# Patient Record
Sex: FEMALE | Race: ASIAN | NOT HISPANIC OR LATINO | URBAN - METROPOLITAN AREA
[De-identification: names, ages, dates, MRNs, and addresses within clinical notes are randomized per-mention and may not be internally consistent; named-entity substitution may affect disease eponyms.]

---

## 2018-03-11 ENCOUNTER — EMERGENCY (EMERGENCY)
Facility: HOSPITAL | Age: 43
LOS: 1 days | Discharge: HOME | End: 2018-03-11
Attending: EMERGENCY MEDICINE | Admitting: EMERGENCY MEDICINE
Payer: COMMERCIAL

## 2018-03-11 VITALS
DIASTOLIC BLOOD PRESSURE: 82 MMHG | SYSTOLIC BLOOD PRESSURE: 128 MMHG | HEART RATE: 86 BPM | TEMPERATURE: 98 F | RESPIRATION RATE: 16 BRPM | WEIGHT: 128.09 LBS | OXYGEN SATURATION: 98 %

## 2018-03-11 DIAGNOSIS — O20.9 HEMORRHAGE IN EARLY PREGNANCY, UNSPECIFIED: ICD-10-CM

## 2018-03-11 DIAGNOSIS — N93.9 ABNORMAL UTERINE AND VAGINAL BLEEDING, UNSPECIFIED: ICD-10-CM

## 2018-03-11 DIAGNOSIS — Z3A.16 16 WEEKS GESTATION OF PREGNANCY: ICD-10-CM

## 2018-03-11 LAB
APTT BLD: 29.4 SEC — SIGNIFICANT CHANGE UP (ref 27.5–37.4)
BASOPHILS NFR BLD AUTO: 0.2 % — SIGNIFICANT CHANGE UP (ref 0–2)
EOSINOPHIL NFR BLD AUTO: 1.6 % — SIGNIFICANT CHANGE UP (ref 0–6)
HCT VFR BLD CALC: 37.5 % — SIGNIFICANT CHANGE UP (ref 34.5–45)
HGB BLD-MCNC: 12.8 G/DL — SIGNIFICANT CHANGE UP (ref 11.5–15.5)
INR BLD: 0.87 — LOW (ref 0.88–1.16)
LYMPHOCYTES # BLD AUTO: 23.9 % — SIGNIFICANT CHANGE UP (ref 13–44)
MCHC RBC-ENTMCNC: 30.2 PG — SIGNIFICANT CHANGE UP (ref 27–34)
MCHC RBC-ENTMCNC: 34.1 G/DL — SIGNIFICANT CHANGE UP (ref 32–36)
MCV RBC AUTO: 88.4 FL — SIGNIFICANT CHANGE UP (ref 80–100)
MONOCYTES NFR BLD AUTO: 5.8 % — SIGNIFICANT CHANGE UP (ref 2–14)
NEUTROPHILS NFR BLD AUTO: 68.5 % — SIGNIFICANT CHANGE UP (ref 43–77)
PLATELET # BLD AUTO: 251 K/UL — SIGNIFICANT CHANGE UP (ref 150–400)
PROTHROM AB SERPL-ACNC: 9.6 SEC — LOW (ref 9.8–12.7)
RBC # BLD: 4.24 M/UL — SIGNIFICANT CHANGE UP (ref 3.8–5.2)
RBC # FLD: 13.4 % — SIGNIFICANT CHANGE UP (ref 10.3–16.9)
WBC # BLD: 8.1 K/UL — SIGNIFICANT CHANGE UP (ref 3.8–10.5)
WBC # FLD AUTO: 8.1 K/UL — SIGNIFICANT CHANGE UP (ref 3.8–10.5)

## 2018-03-11 PROCEDURE — 99284 EMERGENCY DEPT VISIT MOD MDM: CPT | Mod: 25

## 2018-03-12 VITALS
DIASTOLIC BLOOD PRESSURE: 65 MMHG | HEART RATE: 78 BPM | SYSTOLIC BLOOD PRESSURE: 108 MMHG | OXYGEN SATURATION: 99 % | RESPIRATION RATE: 16 BRPM | TEMPERATURE: 98 F

## 2018-03-12 LAB
ALBUMIN SERPL ELPH-MCNC: 3.7 G/DL — SIGNIFICANT CHANGE UP (ref 3.3–5)
ALP SERPL-CCNC: 34 U/L — LOW (ref 40–120)
ALT FLD-CCNC: 21 U/L — SIGNIFICANT CHANGE UP (ref 10–45)
ANION GAP SERPL CALC-SCNC: 14 MMOL/L — SIGNIFICANT CHANGE UP (ref 5–17)
AST SERPL-CCNC: 59 U/L — HIGH (ref 10–40)
BILIRUB SERPL-MCNC: <0.2 MG/DL — SIGNIFICANT CHANGE UP (ref 0.2–1.2)
BLD GP AB SCN SERPL QL: NEGATIVE — SIGNIFICANT CHANGE UP
BUN SERPL-MCNC: 8 MG/DL — SIGNIFICANT CHANGE UP (ref 7–23)
CALCIUM SERPL-MCNC: 9.1 MG/DL — SIGNIFICANT CHANGE UP (ref 8.4–10.5)
CHLORIDE SERPL-SCNC: 100 MMOL/L — SIGNIFICANT CHANGE UP (ref 96–108)
CO2 SERPL-SCNC: 22 MMOL/L — SIGNIFICANT CHANGE UP (ref 22–31)
CREAT SERPL-MCNC: 0.49 MG/DL — LOW (ref 0.5–1.3)
GLUCOSE SERPL-MCNC: 107 MG/DL — HIGH (ref 70–99)
POTASSIUM SERPL-MCNC: 5.9 MMOL/L — HIGH (ref 3.5–5.3)
POTASSIUM SERPL-SCNC: 5.9 MMOL/L — HIGH (ref 3.5–5.3)
PROT SERPL-MCNC: 7.1 G/DL — SIGNIFICANT CHANGE UP (ref 6–8.3)
RH IG SCN BLD-IMP: POSITIVE — SIGNIFICANT CHANGE UP
SODIUM SERPL-SCNC: 136 MMOL/L — SIGNIFICANT CHANGE UP (ref 135–145)

## 2018-03-12 PROCEDURE — 76815 OB US LIMITED FETUS(S): CPT

## 2018-03-12 PROCEDURE — 85610 PROTHROMBIN TIME: CPT

## 2018-03-12 PROCEDURE — 85025 COMPLETE CBC W/AUTO DIFF WBC: CPT

## 2018-03-12 PROCEDURE — 86900 BLOOD TYPING SEROLOGIC ABO: CPT

## 2018-03-12 PROCEDURE — 36415 COLL VENOUS BLD VENIPUNCTURE: CPT

## 2018-03-12 PROCEDURE — 85730 THROMBOPLASTIN TIME PARTIAL: CPT

## 2018-03-12 PROCEDURE — 99284 EMERGENCY DEPT VISIT MOD MDM: CPT | Mod: 25

## 2018-03-12 PROCEDURE — 86901 BLOOD TYPING SEROLOGIC RH(D): CPT

## 2018-03-12 PROCEDURE — 80053 COMPREHEN METABOLIC PANEL: CPT

## 2018-03-12 PROCEDURE — 86850 RBC ANTIBODY SCREEN: CPT

## 2018-03-12 PROCEDURE — 76815 OB US LIMITED FETUS(S): CPT | Mod: 26

## 2018-03-12 NOTE — ED PROVIDER NOTE - PHYSICAL EXAMINATION
GEN: Well appearing, well nourished, awake, alert, oriented to person, place, time/situation and in no apparent distress.  ENT: Airway patent, Nasal mucosa clear. Mouth with normal mucosa.  EYES: Clear bilaterally.  RESPIRATORY: Breathing comfortably with normal RR.  CARDIAC: Regular rate and rhythm  ABDOMEN: Soft, gravid, nontender, +bowel sounds, no rebound, rigidity, or guarding.  : deferred to gyn  MSK: Range of motion is not limited, no deformities noted.  NEURO: Alert and oriented, no focal deficits.  SKIN: Skin normal color for race, warm, dry and intact. No evidence of rash.  PSYCH: Alert and oriented to person, place, time/situation. normal mood and affect. no apparent risk to self or others.

## 2018-03-12 NOTE — ED PROVIDER NOTE - OBJECTIVE STATEMENT
42F  (h/o 2 1st Trimester ), 16 weeks pregnant who p/w vaginal bleeding tonight. Pt states she cough and then had a gush of blood, no clots, no cramping, now just bleeding a little. No abdominal pain, no syncope. Ob is Dr. Arreaga.

## 2018-03-12 NOTE — CONSULT NOTE ADULT - ASSESSMENT
41yo  at 16w1d w/ complete previa and an episode of vaginal bleeding.  -Fetus with positive FH, no signs of  labor, cervix long and closed, no active bleeding  -Pelvic rest  -f/u w/ Dr. Staton this week  -f/u type--if rh negative please give rho hung    discussed with Dr. Staton. 41yo  at 16w1d w/ complete previa and an episode of vaginal bleeding.  -Fetus with positive FH, no signs of  labor, cervix long and closed, no active bleeding  -Pelvic rest  -f/u w/ Dr. Staton this week  -Rh positive    discussed with Dr. Staton.

## 2018-03-12 NOTE — CONSULT NOTE ADULT - SUBJECTIVE AND OBJECTIVE BOX
43 yo  at 16w1d presents for 1 episode of vaginal bleeding today. Patient states she coughed and had a gush of bright red blood from the vagina. Denies abdominal cramping. Feels fetal movement. Has had spotting since the initially gush. No passage of fetal tissue. This is an IVF pregnancy, otherwise uncomplicated.     Obhx: G1 '15 FT   G2 IVF SAB @5w  Gynhx: denies  PMH: denies  PSH: denies  Meds: PNV  NKDA    Vital Signs Last 24 Hrs  T(C): 36.4 (11 Mar 2018 22:49), Max: 36.4 (11 Mar 2018 22:49)  T(F): 97.6 (11 Mar 2018 22:49), Max: 97.6 (11 Mar 2018 22:49)  HR: 86 (11 Mar 2018 22:49) (86 - 86)  BP: 128/82 (11 Mar 2018 22:49) (128/82 - 128/82)  BP(mean): --  RR: 16 (11 Mar 2018 22:49) (16 - 16)  SpO2: 98% (11 Mar 2018 22:49) (98% - 98%)    Gen: NAD, AOx3  Chest: normal work of breathing  Abdomen: soft, nontender, nondistended, no rebound or guarding, fundus below umbilicus  Pelvic: cervix grossly normal, long and closed, scant dark blood, no active bleeding  Extremities: WWP    Bedside sono: , normal fetal movement, cervix 2.0 cm long, closed    LABS:                        12.8   8.1   )-----------( 251      ( 11 Mar 2018 23:41 )             37.5     03-11    136  |  100  |  8   ----------------------------<  107<H>  5.9<H>   |  22  |  0.49<L>    Ca    9.1      11 Mar 2018 23:41    TPro  7.1  /  Alb  3.7  /  TBili  <0.2  /  DBili  x   /  AST  59<H>  /  ALT  21  /  AlkPhos  34<L>  03-11    PT/INR - ( 11 Mar 2018 23:41 )   PT: 9.6 sec;   INR: 0.87          PTT - ( 11 Mar 2018 23:41 )  PTT:29.4 sec 43 yo  at 16w1d presents for 1 episode of vaginal bleeding today. Patient states she coughed and had a gush of bright red blood from the vagina. Denies abdominal cramping. Feels fetal movement. Has had spotting since the initially gush. No passage of fetal tissue. This is an IVF pregnancy, otherwise uncomplicated.     Obhx: G1 '15 FT   G2 IVF SAB @5w  Gynhx: denies  PMH: denies  PSH: denies  Meds: PNV  NKDA    Vital Signs Last 24 Hrs  T(C): 36.4 (11 Mar 2018 22:49), Max: 36.4 (11 Mar 2018 22:49)  T(F): 97.6 (11 Mar 2018 22:49), Max: 97.6 (11 Mar 2018 22:49)  HR: 86 (11 Mar 2018 22:49) (86 - 86)  BP: 128/82 (11 Mar 2018 22:49) (128/82 - 128/82)  BP(mean): --  RR: 16 (11 Mar 2018 22:49) (16 - 16)  SpO2: 98% (11 Mar 2018 22:49) (98% - 98%)    Gen: NAD, AOx3  Chest: normal work of breathing  Abdomen: soft, nontender, nondistended, no rebound or guarding, fundus below umbilicus  Pelvic: cervix grossly normal, long and closed, scant dark blood, no active bleeding  Extremities: WWP    Bedside sono: , normal fetal movement, cervix 4.0 cm long, closed, complete placenta previa    LABS:                        12.8   8.1   )-----------( 251      ( 11 Mar 2018 23:41 )             37.5     03-11    136  |  100  |  8   ----------------------------<  107<H>  5.9<H>   |  22  |  0.49<L>    Ca    9.1      11 Mar 2018 23:41    TPro  7.1  /  Alb  3.7  /  TBili  <0.2  /  DBili  x   /  AST  59<H>  /  ALT  21  /  AlkPhos  34<L>  03-11    PT/INR - ( 11 Mar 2018 23:41 )   PT: 9.6 sec;   INR: 0.87          PTT - ( 11 Mar 2018 23:41 )  PTT:29.4 sec

## 2018-04-15 ENCOUNTER — OUTPATIENT (OUTPATIENT)
Dept: OUTPATIENT SERVICES | Facility: HOSPITAL | Age: 43
LOS: 1 days | End: 2018-04-15
Payer: COMMERCIAL

## 2018-04-15 DIAGNOSIS — O26.899 OTHER SPECIFIED PREGNANCY RELATED CONDITIONS, UNSPECIFIED TRIMESTER: ICD-10-CM

## 2018-04-15 DIAGNOSIS — Z3A.00 WEEKS OF GESTATION OF PREGNANCY NOT SPECIFIED: ICD-10-CM

## 2018-04-15 PROCEDURE — 99214 OFFICE O/P EST MOD 30 MIN: CPT

## 2018-08-10 ENCOUNTER — OUTPATIENT (OUTPATIENT)
Dept: OUTPATIENT SERVICES | Facility: HOSPITAL | Age: 43
LOS: 1 days | End: 2018-08-10
Payer: COMMERCIAL

## 2018-08-10 DIAGNOSIS — O26.899 OTHER SPECIFIED PREGNANCY RELATED CONDITIONS, UNSPECIFIED TRIMESTER: ICD-10-CM

## 2018-08-10 DIAGNOSIS — Z3A.00 WEEKS OF GESTATION OF PREGNANCY NOT SPECIFIED: ICD-10-CM

## 2018-08-10 PROCEDURE — 76818 FETAL BIOPHYS PROFILE W/NST: CPT

## 2018-08-10 PROCEDURE — 59025 FETAL NON-STRESS TEST: CPT

## 2018-08-10 PROCEDURE — 99214 OFFICE O/P EST MOD 30 MIN: CPT

## 2018-08-19 ENCOUNTER — INPATIENT (INPATIENT)
Facility: HOSPITAL | Age: 43
LOS: 2 days | Discharge: HOME | End: 2018-08-22
Attending: OBSTETRICS & GYNECOLOGY | Admitting: OBSTETRICS & GYNECOLOGY
Payer: COMMERCIAL

## 2018-08-19 RX ORDER — SODIUM CHLORIDE 9 MG/ML
1000 INJECTION, SOLUTION INTRAVENOUS ONCE
Qty: 0 | Refills: 0 | Status: COMPLETED | OUTPATIENT
Start: 2018-08-19 | End: 2018-08-20

## 2018-08-19 RX ORDER — CITRIC ACID/SODIUM CITRATE 300-500 MG
15 SOLUTION, ORAL ORAL EVERY 4 HOURS
Qty: 0 | Refills: 0 | Status: DISCONTINUED | OUTPATIENT
Start: 2018-08-19 | End: 2018-08-20

## 2018-08-19 RX ORDER — OXYTOCIN 10 UNIT/ML
333.33 VIAL (ML) INJECTION
Qty: 20 | Refills: 0 | Status: DISCONTINUED | OUTPATIENT
Start: 2018-08-19 | End: 2018-08-20

## 2018-08-19 RX ORDER — SODIUM CHLORIDE 9 MG/ML
1000 INJECTION, SOLUTION INTRAVENOUS
Qty: 0 | Refills: 0 | Status: DISCONTINUED | OUTPATIENT
Start: 2018-08-19 | End: 2018-08-20

## 2018-08-20 ENCOUNTER — RESULT REVIEW (OUTPATIENT)
Age: 43
End: 2018-08-20

## 2018-08-20 VITALS — HEIGHT: 63 IN | WEIGHT: 147.93 LBS

## 2018-08-20 LAB
ALBUMIN SERPL ELPH-MCNC: 3.2 G/DL — LOW (ref 3.3–5)
ALBUMIN SERPL ELPH-MCNC: 3.7 G/DL — SIGNIFICANT CHANGE UP (ref 3.3–5)
ALP SERPL-CCNC: 123 U/L — HIGH (ref 40–120)
ALP SERPL-CCNC: 128 U/L — HIGH (ref 40–120)
ALT FLD-CCNC: 13 U/L — SIGNIFICANT CHANGE UP (ref 10–45)
ALT FLD-CCNC: 14 U/L — SIGNIFICANT CHANGE UP (ref 10–45)
ANION GAP SERPL CALC-SCNC: 14 MMOL/L — SIGNIFICANT CHANGE UP (ref 5–17)
ANION GAP SERPL CALC-SCNC: 19 MMOL/L — HIGH (ref 5–17)
APPEARANCE UR: CLEAR — SIGNIFICANT CHANGE UP
APTT BLD: 28.1 SEC — SIGNIFICANT CHANGE UP (ref 27.5–37.4)
AST SERPL-CCNC: 21 U/L — SIGNIFICANT CHANGE UP (ref 10–40)
AST SERPL-CCNC: 27 U/L — SIGNIFICANT CHANGE UP (ref 10–40)
BASE EXCESS BLDCOA CALC-SCNC: -6.1 MMOL/L — SIGNIFICANT CHANGE UP (ref -11.6–0.4)
BASE EXCESS BLDCOV CALC-SCNC: -7.2 MMOL/L — SIGNIFICANT CHANGE UP (ref -9.3–0.3)
BASOPHILS NFR BLD AUTO: 0.1 % — SIGNIFICANT CHANGE UP (ref 0–2)
BILIRUB SERPL-MCNC: 0.2 MG/DL — SIGNIFICANT CHANGE UP (ref 0.2–1.2)
BILIRUB SERPL-MCNC: 0.4 MG/DL — SIGNIFICANT CHANGE UP (ref 0.2–1.2)
BILIRUB UR-MCNC: NEGATIVE — SIGNIFICANT CHANGE UP
BLD GP AB SCN SERPL QL: NEGATIVE — SIGNIFICANT CHANGE UP
BUN SERPL-MCNC: 13 MG/DL — SIGNIFICANT CHANGE UP (ref 7–23)
BUN SERPL-MCNC: 9 MG/DL — SIGNIFICANT CHANGE UP (ref 7–23)
CALCIUM SERPL-MCNC: 9.1 MG/DL — SIGNIFICANT CHANGE UP (ref 8.4–10.5)
CALCIUM SERPL-MCNC: 9.5 MG/DL — SIGNIFICANT CHANGE UP (ref 8.4–10.5)
CHLORIDE SERPL-SCNC: 100 MMOL/L — SIGNIFICANT CHANGE UP (ref 96–108)
CHLORIDE SERPL-SCNC: 100 MMOL/L — SIGNIFICANT CHANGE UP (ref 96–108)
CO2 SERPL-SCNC: 20 MMOL/L — LOW (ref 22–31)
CO2 SERPL-SCNC: 22 MMOL/L — SIGNIFICANT CHANGE UP (ref 22–31)
COLOR SPEC: YELLOW — SIGNIFICANT CHANGE UP
CREAT SERPL-MCNC: 0.6 MG/DL — SIGNIFICANT CHANGE UP (ref 0.5–1.3)
CREAT SERPL-MCNC: 0.61 MG/DL — SIGNIFICANT CHANGE UP (ref 0.5–1.3)
DIFF PNL FLD: ABNORMAL
EOSINOPHIL NFR BLD AUTO: 0.7 % — SIGNIFICANT CHANGE UP (ref 0–6)
FIBRINOGEN PPP-MCNC: 482 MG/DL — HIGH (ref 258–438)
GAS PNL BLDCOA: SIGNIFICANT CHANGE UP
GAS PNL BLDCOV: 7.3 — SIGNIFICANT CHANGE UP (ref 7.25–7.45)
GAS PNL BLDCOV: SIGNIFICANT CHANGE UP
GLUCOSE SERPL-MCNC: 118 MG/DL — HIGH (ref 70–99)
GLUCOSE SERPL-MCNC: 52 MG/DL — LOW (ref 70–99)
GLUCOSE UR QL: NEGATIVE — SIGNIFICANT CHANGE UP
HCO3 BLDCOA-SCNC: 23 MMOL/L — SIGNIFICANT CHANGE UP
HCO3 BLDCOV-SCNC: 18.6 MMOL/L — SIGNIFICANT CHANGE UP
HCT VFR BLD CALC: 29.4 % — LOW (ref 34.5–45)
HCT VFR BLD CALC: 36.1 % — SIGNIFICANT CHANGE UP (ref 34.5–45)
HCT VFR BLD CALC: 37.6 % — SIGNIFICANT CHANGE UP (ref 34.5–45)
HGB BLD-MCNC: 10 G/DL — LOW (ref 11.5–15.5)
HGB BLD-MCNC: 12.5 G/DL — SIGNIFICANT CHANGE UP (ref 11.5–15.5)
HGB BLD-MCNC: 12.6 G/DL — SIGNIFICANT CHANGE UP (ref 11.5–15.5)
INR BLD: 0.89 — SIGNIFICANT CHANGE UP (ref 0.88–1.16)
KETONES UR-MCNC: 15 MG/DL
LDH SERPL L TO P-CCNC: 317 U/L — HIGH (ref 50–242)
LDH SERPL L TO P-CCNC: 329 U/L — HIGH (ref 50–242)
LEUKOCYTE ESTERASE UR-ACNC: NEGATIVE — SIGNIFICANT CHANGE UP
LYMPHOCYTES # BLD AUTO: 18.7 % — SIGNIFICANT CHANGE UP (ref 13–44)
MCHC RBC-ENTMCNC: 29.8 PG — SIGNIFICANT CHANGE UP (ref 27–34)
MCHC RBC-ENTMCNC: 29.9 PG — SIGNIFICANT CHANGE UP (ref 27–34)
MCHC RBC-ENTMCNC: 30.6 PG — SIGNIFICANT CHANGE UP (ref 27–34)
MCHC RBC-ENTMCNC: 33.5 G/DL — SIGNIFICANT CHANGE UP (ref 32–36)
MCHC RBC-ENTMCNC: 34 G/DL — SIGNIFICANT CHANGE UP (ref 32–36)
MCHC RBC-ENTMCNC: 34.6 G/DL — SIGNIFICANT CHANGE UP (ref 32–36)
MCV RBC AUTO: 87.8 FL — SIGNIFICANT CHANGE UP (ref 80–100)
MCV RBC AUTO: 88.3 FL — SIGNIFICANT CHANGE UP (ref 80–100)
MCV RBC AUTO: 88.9 FL — SIGNIFICANT CHANGE UP (ref 80–100)
MONOCYTES NFR BLD AUTO: 7 % — SIGNIFICANT CHANGE UP (ref 2–14)
NEUTROPHILS NFR BLD AUTO: 73.5 % — SIGNIFICANT CHANGE UP (ref 43–77)
NITRITE UR-MCNC: NEGATIVE — SIGNIFICANT CHANGE UP
PCO2 BLDCOA: 60 MMHG — SIGNIFICANT CHANGE UP (ref 32–66)
PCO2 BLDCOV: 39 MMHG — SIGNIFICANT CHANGE UP (ref 27–49)
PH BLDCOA: 7.2 — SIGNIFICANT CHANGE UP (ref 7.18–7.38)
PH UR: 7 — SIGNIFICANT CHANGE UP (ref 5–8)
PLATELET # BLD AUTO: 162 K/UL — SIGNIFICANT CHANGE UP (ref 150–400)
PLATELET # BLD AUTO: 183 K/UL — SIGNIFICANT CHANGE UP (ref 150–400)
PLATELET # BLD AUTO: 201 K/UL — SIGNIFICANT CHANGE UP (ref 150–400)
PO2 BLDCOA: 18 MMHG — SIGNIFICANT CHANGE UP (ref 6–31)
PO2 BLDCOA: 34 MMHG — SIGNIFICANT CHANGE UP (ref 17–41)
POTASSIUM SERPL-MCNC: 4 MMOL/L — SIGNIFICANT CHANGE UP (ref 3.5–5.3)
POTASSIUM SERPL-MCNC: 4.1 MMOL/L — SIGNIFICANT CHANGE UP (ref 3.5–5.3)
POTASSIUM SERPL-SCNC: 4 MMOL/L — SIGNIFICANT CHANGE UP (ref 3.5–5.3)
POTASSIUM SERPL-SCNC: 4.1 MMOL/L — SIGNIFICANT CHANGE UP (ref 3.5–5.3)
PROT SERPL-MCNC: 6.1 G/DL — SIGNIFICANT CHANGE UP (ref 6–8.3)
PROT SERPL-MCNC: 6.8 G/DL — SIGNIFICANT CHANGE UP (ref 6–8.3)
PROT UR-MCNC: NEGATIVE MG/DL — SIGNIFICANT CHANGE UP
PROTHROM AB SERPL-ACNC: 9.8 SEC — SIGNIFICANT CHANGE UP (ref 9.8–12.7)
RBC # BLD: 3.35 M/UL — LOW (ref 3.8–5.2)
RBC # BLD: 4.09 M/UL — SIGNIFICANT CHANGE UP (ref 3.8–5.2)
RBC # BLD: 4.23 M/UL — SIGNIFICANT CHANGE UP (ref 3.8–5.2)
RBC # FLD: 13.3 % — SIGNIFICANT CHANGE UP (ref 10.3–16.9)
RBC # FLD: 13.7 % — SIGNIFICANT CHANGE UP (ref 10.3–16.9)
RBC # FLD: 13.8 % — SIGNIFICANT CHANGE UP (ref 10.3–16.9)
RH IG SCN BLD-IMP: POSITIVE — SIGNIFICANT CHANGE UP
SAO2 % BLDCOA: 29.7 % — SIGNIFICANT CHANGE UP
SAO2 % BLDCOV: 75.3 % — SIGNIFICANT CHANGE UP
SODIUM SERPL-SCNC: 136 MMOL/L — SIGNIFICANT CHANGE UP (ref 135–145)
SODIUM SERPL-SCNC: 139 MMOL/L — SIGNIFICANT CHANGE UP (ref 135–145)
SP GR SPEC: <=1.005 — SIGNIFICANT CHANGE UP (ref 1–1.03)
T PALLIDUM AB TITR SER: NEGATIVE — SIGNIFICANT CHANGE UP
URATE SERPL-MCNC: 5.9 MG/DL — SIGNIFICANT CHANGE UP (ref 2.5–7)
URATE SERPL-MCNC: 6.1 MG/DL — SIGNIFICANT CHANGE UP (ref 2.5–7)
UROBILINOGEN FLD QL: 0.2 E.U./DL — SIGNIFICANT CHANGE UP
WBC # BLD: 12.2 K/UL — HIGH (ref 3.8–10.5)
WBC # BLD: 17.5 K/UL — HIGH (ref 3.8–10.5)
WBC # BLD: 7.6 K/UL — SIGNIFICANT CHANGE UP (ref 3.8–10.5)
WBC # FLD AUTO: 12.2 K/UL — HIGH (ref 3.8–10.5)
WBC # FLD AUTO: 17.5 K/UL — HIGH (ref 3.8–10.5)
WBC # FLD AUTO: 7.6 K/UL — SIGNIFICANT CHANGE UP (ref 3.8–10.5)

## 2018-08-20 RX ORDER — IBUPROFEN 200 MG
600 TABLET ORAL EVERY 6 HOURS
Qty: 0 | Refills: 0 | Status: DISCONTINUED | OUTPATIENT
Start: 2018-08-20 | End: 2018-08-22

## 2018-08-20 RX ORDER — MAGNESIUM HYDROXIDE 400 MG/1
30 TABLET, CHEWABLE ORAL
Qty: 0 | Refills: 0 | Status: DISCONTINUED | OUTPATIENT
Start: 2018-08-20 | End: 2018-08-22

## 2018-08-20 RX ORDER — LANOLIN
1 OINTMENT (GRAM) TOPICAL EVERY 6 HOURS
Qty: 0 | Refills: 0 | Status: DISCONTINUED | OUTPATIENT
Start: 2018-08-20 | End: 2018-08-22

## 2018-08-20 RX ORDER — HYDROCORTISONE 1 %
1 OINTMENT (GRAM) TOPICAL EVERY 4 HOURS
Qty: 0 | Refills: 0 | Status: DISCONTINUED | OUTPATIENT
Start: 2018-08-20 | End: 2018-08-22

## 2018-08-20 RX ORDER — ACETAMINOPHEN 500 MG
650 TABLET ORAL EVERY 6 HOURS
Qty: 0 | Refills: 0 | Status: DISCONTINUED | OUTPATIENT
Start: 2018-08-20 | End: 2018-08-22

## 2018-08-20 RX ORDER — TETANUS TOXOID, REDUCED DIPHTHERIA TOXOID AND ACELLULAR PERTUSSIS VACCINE, ADSORBED 5; 2.5; 8; 8; 2.5 [IU]/.5ML; [IU]/.5ML; UG/.5ML; UG/.5ML; UG/.5ML
0.5 SUSPENSION INTRAMUSCULAR ONCE
Qty: 0 | Refills: 0 | Status: DISCONTINUED | OUTPATIENT
Start: 2018-08-20 | End: 2018-08-22

## 2018-08-20 RX ORDER — PRAMOXINE HYDROCHLORIDE 150 MG/15G
1 AEROSOL, FOAM RECTAL EVERY 4 HOURS
Qty: 0 | Refills: 0 | Status: DISCONTINUED | OUTPATIENT
Start: 2018-08-20 | End: 2018-08-22

## 2018-08-20 RX ORDER — OXYCODONE AND ACETAMINOPHEN 5; 325 MG/1; MG/1
1 TABLET ORAL
Qty: 0 | Refills: 0 | Status: DISCONTINUED | OUTPATIENT
Start: 2018-08-20 | End: 2018-08-22

## 2018-08-20 RX ORDER — DOCUSATE SODIUM 100 MG
100 CAPSULE ORAL
Qty: 0 | Refills: 0 | Status: DISCONTINUED | OUTPATIENT
Start: 2018-08-20 | End: 2018-08-22

## 2018-08-20 RX ORDER — OXYTOCIN 10 UNIT/ML
41.67 VIAL (ML) INJECTION
Qty: 20 | Refills: 0 | Status: DISCONTINUED | OUTPATIENT
Start: 2018-08-20 | End: 2018-08-22

## 2018-08-20 RX ORDER — OXYTOCIN 10 UNIT/ML
1 VIAL (ML) INJECTION
Qty: 30 | Refills: 0 | Status: DISCONTINUED | OUTPATIENT
Start: 2018-08-20 | End: 2018-08-22

## 2018-08-20 RX ORDER — DIBUCAINE 1 %
1 OINTMENT (GRAM) RECTAL EVERY 4 HOURS
Qty: 0 | Refills: 0 | Status: DISCONTINUED | OUTPATIENT
Start: 2018-08-20 | End: 2018-08-22

## 2018-08-20 RX ORDER — GLYCERIN ADULT
1 SUPPOSITORY, RECTAL RECTAL AT BEDTIME
Qty: 0 | Refills: 0 | Status: DISCONTINUED | OUTPATIENT
Start: 2018-08-20 | End: 2018-08-22

## 2018-08-20 RX ORDER — OXYCODONE AND ACETAMINOPHEN 5; 325 MG/1; MG/1
2 TABLET ORAL EVERY 6 HOURS
Qty: 0 | Refills: 0 | Status: DISCONTINUED | OUTPATIENT
Start: 2018-08-20 | End: 2018-08-22

## 2018-08-20 RX ORDER — AER TRAVELER 0.5 G/1
1 SOLUTION RECTAL; TOPICAL EVERY 4 HOURS
Qty: 0 | Refills: 0 | Status: DISCONTINUED | OUTPATIENT
Start: 2018-08-20 | End: 2018-08-22

## 2018-08-20 RX ORDER — SIMETHICONE 80 MG/1
80 TABLET, CHEWABLE ORAL EVERY 6 HOURS
Qty: 0 | Refills: 0 | Status: DISCONTINUED | OUTPATIENT
Start: 2018-08-20 | End: 2018-08-22

## 2018-08-20 RX ORDER — SODIUM CHLORIDE 9 MG/ML
3 INJECTION INTRAMUSCULAR; INTRAVENOUS; SUBCUTANEOUS EVERY 8 HOURS
Qty: 0 | Refills: 0 | Status: DISCONTINUED | OUTPATIENT
Start: 2018-08-20 | End: 2018-08-22

## 2018-08-20 RX ORDER — DIPHENHYDRAMINE HCL 50 MG
25 CAPSULE ORAL EVERY 6 HOURS
Qty: 0 | Refills: 0 | Status: DISCONTINUED | OUTPATIENT
Start: 2018-08-20 | End: 2018-08-22

## 2018-08-20 RX ADMIN — SODIUM CHLORIDE 125 MILLILITER(S): 9 INJECTION, SOLUTION INTRAVENOUS at 00:20

## 2018-08-20 RX ADMIN — Medication 600 MILLIGRAM(S): at 17:40

## 2018-08-20 RX ADMIN — SODIUM CHLORIDE 2000 MILLILITER(S): 9 INJECTION, SOLUTION INTRAVENOUS at 05:17

## 2018-08-20 RX ADMIN — Medication 650 MILLIGRAM(S): at 20:25

## 2018-08-20 RX ADMIN — SODIUM CHLORIDE 3 MILLILITER(S): 9 INJECTION INTRAMUSCULAR; INTRAVENOUS; SUBCUTANEOUS at 22:24

## 2018-08-20 RX ADMIN — Medication 650 MILLIGRAM(S): at 21:30

## 2018-08-20 RX ADMIN — Medication 1 MILLIUNIT(S)/MIN: at 00:53

## 2018-08-20 NOTE — PROVIDER CONTACT NOTE (OTHER) - ACTION/TREATMENT ORDERED:
14 Pashto aceves catheter placed (pt tolerated well), 750ml of clear yellow urine obtained. Dr Morris made aware. MD will place order for aceves catheter.

## 2018-08-21 LAB
HCT VFR BLD CALC: 28.6 % — LOW (ref 34.5–45)
HGB BLD-MCNC: 9.3 G/DL — LOW (ref 11.5–15.5)
MCHC RBC-ENTMCNC: 28.9 PG — SIGNIFICANT CHANGE UP (ref 27–34)
MCHC RBC-ENTMCNC: 32.5 G/DL — SIGNIFICANT CHANGE UP (ref 32–36)
MCV RBC AUTO: 88.8 FL — SIGNIFICANT CHANGE UP (ref 80–100)
PLATELET # BLD AUTO: 153 K/UL — SIGNIFICANT CHANGE UP (ref 150–400)
RBC # BLD: 3.22 M/UL — LOW (ref 3.8–5.2)
RBC # FLD: 13.3 % — SIGNIFICANT CHANGE UP (ref 10.3–16.9)
WBC # BLD: 9.8 K/UL — SIGNIFICANT CHANGE UP (ref 3.8–10.5)
WBC # FLD AUTO: 9.8 K/UL — SIGNIFICANT CHANGE UP (ref 3.8–10.5)

## 2018-08-21 RX ADMIN — Medication 600 MILLIGRAM(S): at 07:00

## 2018-08-21 RX ADMIN — Medication 600 MILLIGRAM(S): at 14:45

## 2018-08-21 RX ADMIN — AER TRAVELER 1 APPLICATION(S): 0.5 SOLUTION RECTAL; TOPICAL at 14:29

## 2018-08-21 RX ADMIN — Medication 600 MILLIGRAM(S): at 19:51

## 2018-08-21 RX ADMIN — Medication 600 MILLIGRAM(S): at 01:00

## 2018-08-21 RX ADMIN — SODIUM CHLORIDE 3 MILLILITER(S): 9 INJECTION INTRAMUSCULAR; INTRAVENOUS; SUBCUTANEOUS at 14:29

## 2018-08-21 RX ADMIN — AER TRAVELER 1 APPLICATION(S): 0.5 SOLUTION RECTAL; TOPICAL at 18:00

## 2018-08-21 RX ADMIN — Medication 600 MILLIGRAM(S): at 06:08

## 2018-08-21 RX ADMIN — AER TRAVELER 1 APPLICATION(S): 0.5 SOLUTION RECTAL; TOPICAL at 08:00

## 2018-08-21 RX ADMIN — Medication 650 MILLIGRAM(S): at 02:41

## 2018-08-21 RX ADMIN — Medication 600 MILLIGRAM(S): at 14:03

## 2018-08-21 RX ADMIN — Medication 1 APPLICATION(S): at 14:02

## 2018-08-21 RX ADMIN — Medication 1 TABLET(S): at 14:03

## 2018-08-21 RX ADMIN — AER TRAVELER 1 APPLICATION(S): 0.5 SOLUTION RECTAL; TOPICAL at 11:00

## 2018-08-21 RX ADMIN — Medication 100 MILLIGRAM(S): at 00:15

## 2018-08-21 RX ADMIN — Medication 600 MILLIGRAM(S): at 19:13

## 2018-08-21 RX ADMIN — Medication 600 MILLIGRAM(S): at 00:15

## 2018-08-21 RX ADMIN — Medication 100 MILLIGRAM(S): at 14:03

## 2018-08-21 RX ADMIN — SODIUM CHLORIDE 3 MILLILITER(S): 9 INJECTION INTRAMUSCULAR; INTRAVENOUS; SUBCUTANEOUS at 06:00

## 2018-08-21 RX ADMIN — Medication 650 MILLIGRAM(S): at 03:30

## 2018-08-21 NOTE — LACTATION INITIAL EVALUATION - NS LACT CON REASON FOR REQ
multiparous mom/Baby ~26 hrs old, currently breastfeeding. Mother reports baby has fed well since delivery. Upon visit, baby was feeding on right breast with a shallow latch that mother stated is painful, due to incorrect positioning. Assisted the mother to properly align baby and pull in closer, chin lifted up toward the breast, and baby responded with a deeper latch and more rhythmic sucking. Mother reports no pain with latch. Positioning and deep latching techniques reviewed with good return demonstration by mother. Breastfeeding basics reviewed. Encouraged continued skin-to-skin and rooming-in and for the parents to call for further assistance as needed. Baby 39.1 GA, 3 urine/4 stool diapers, 0.75% weight loss. Mother has h/o breastfeeding previous infant for 1.5 years with no complications.

## 2018-08-21 NOTE — PROGRESS NOTE ADULT - SUBJECTIVE AND OBJECTIVE BOX
Patient evaluated at bedside.   She reports pain is well controlled with motrin and tylenol. Patient was unable to void postpartum, aceves was replaced yesterday and still in place today. To be removed this morning.     She has been ambulating without assistance, voiding spontaneously, and is breastfeeding.    She denies HA, changes in vision, RUQ pain, dizziness, chest pain, palpitations, shortness of breath, n/v, heavy vaginal bleeding or perineal discomfort.    Physical Exam:  Vital Signs Last 24 Hrs  T(C): 36.6 (20 Aug 2018 23:40), Max: 37.1 (20 Aug 2018 19:00)  T(F): 97.8 (20 Aug 2018 23:40), Max: 98.8 (20 Aug 2018 22:30)  HR: 64 (20 Aug 2018 23:40) (64 - 86)  BP: 106/67 (20 Aug 2018 23:40) (95/67 - 146/59)  BP(mean): --  RR: 18 (20 Aug 2018 23:40) (16 - 18)  SpO2: 98% (20 Aug 2018 23:40) (97% - 99%)    GA: NAD, A+0 x 3  Abd: + BS, soft, nontender, nondistended, no rebound or guarding, uterus firm at midline  : lochia WNL  Extremities: no swelling or calf tenderness                          10.0   12.2  )-----------( 162      ( 20 Aug 2018 22:22 )             29.4     08-20    136  |  100  |  9   ----------------------------<  118<H>  4.1   |  22  |  0.60    Ca    9.1      20 Aug 2018 18:01    TPro  6.1  /  Alb  3.2<L>  /  TBili  0.4  /  DBili  x   /  AST  27  /  ALT  13  /  AlkPhos  123<H>  08-20    PT/INR - ( 20 Aug 2018 10:06 )   PT: 9.8 sec;   INR: 0.89          PTT - ( 20 Aug 2018 10:06 )  PTT:28.1 sec    MEDICATIONS  (STANDING):  diphtheria/tetanus/pertussis (acellular) Vaccine (ADAcel) 0.5 milliLiter(s) IntraMuscular once  fentaNYL (2 MICROgram(s)/mL) + BUpivacaine 0.1% in 0.9% Sodium Chloride PCEA 250 milliLiter(s) Epidural PCA Continuous  ibuprofen  Tablet 600 milliGRAM(s) Oral every 6 hours  oxytocin Infusion 41.667 milliUNIT(s)/Min (125 mL/Hr) IV Continuous <Continuous>  oxytocin Infusion 1 milliUNIT(s)/Min (1 mL/Hr) IV Continuous <Continuous>  prenatal multivitamin 1 Tablet(s) Oral daily  sodium chloride 0.9% lock flush 3 milliLiter(s) IV Push every 8 hours  witch hazel Pads 1 Application(s) Topical every 4 hours    MEDICATIONS  (PRN):  acetaminophen   Tablet 650 milliGRAM(s) Oral every 6 hours PRN For Temp greater than 38.5 C (101.3 F)  acetaminophen   Tablet. 650 milliGRAM(s) Oral every 6 hours PRN Mild Pain  dibucaine 1% Ointment 1 Application(s) Topical every 4 hours PRN Mild Perineal Discomfort  diphenhydrAMINE   Capsule 25 milliGRAM(s) Oral every 6 hours PRN Itching  docusate sodium 100 milliGRAM(s) Oral two times a day PRN Stool Softening  glycerin Suppository - Adult 1 Suppository(s) Rectal at bedtime PRN Constipation  hydrocortisone 1% Cream 1 Application(s) Topical every 4 hours PRN Severe Perineal Pain  lanolin Ointment 1 Application(s) Topical every 6 hours PRN Sore Nipples  magnesium hydroxide Suspension 30 milliLiter(s) Oral two times a day PRN Constipation  oxyCODONE    5 mG/acetaminophen 325 mG 1 Tablet(s) Oral every 3 hours PRN Moderate Pain  oxyCODONE    5 mG/acetaminophen 325 mG 2 Tablet(s) Oral every 6 hours PRN Severe Pain  pramoxine 1%/zinc 5% Cream 1 Application(s) Topical every 4 hours PRN Moderate Perineal Pain  simethicone 80 milliGRAM(s) Chew every 6 hours PRN Gas

## 2018-08-21 NOTE — PROGRESS NOTE ADULT - ASSESSMENT
A/P yo 42y  s/p , PP#1, c/b shoulder dystocia, EBL 900ml and urinary retention PP  1. Pain: OPM  2. GI: Reg  3. :  aceves in place, to come out this am with TOV 6 hours later   4. DVT prophylaxis: SCDs, ambulation  5. Dispo: PP#2

## 2018-08-22 ENCOUNTER — TRANSCRIPTION ENCOUNTER (OUTPATIENT)
Age: 43
End: 2018-08-22

## 2018-08-22 VITALS
TEMPERATURE: 98 F | HEART RATE: 75 BPM | RESPIRATION RATE: 18 BRPM | OXYGEN SATURATION: 96 % | SYSTOLIC BLOOD PRESSURE: 131 MMHG | DIASTOLIC BLOOD PRESSURE: 86 MMHG

## 2018-08-22 PROCEDURE — 85025 COMPLETE CBC W/AUTO DIFF WBC: CPT

## 2018-08-22 PROCEDURE — 36415 COLL VENOUS BLD VENIPUNCTURE: CPT

## 2018-08-22 PROCEDURE — 83615 LACTATE (LD) (LDH) ENZYME: CPT

## 2018-08-22 PROCEDURE — 82803 BLOOD GASES ANY COMBINATION: CPT

## 2018-08-22 PROCEDURE — 85027 COMPLETE CBC AUTOMATED: CPT

## 2018-08-22 PROCEDURE — 86780 TREPONEMA PALLIDUM: CPT

## 2018-08-22 PROCEDURE — 86850 RBC ANTIBODY SCREEN: CPT

## 2018-08-22 PROCEDURE — 85384 FIBRINOGEN ACTIVITY: CPT

## 2018-08-22 PROCEDURE — 88307 TISSUE EXAM BY PATHOLOGIST: CPT

## 2018-08-22 PROCEDURE — 85730 THROMBOPLASTIN TIME PARTIAL: CPT

## 2018-08-22 PROCEDURE — 80053 COMPREHEN METABOLIC PANEL: CPT

## 2018-08-22 PROCEDURE — 84550 ASSAY OF BLOOD/URIC ACID: CPT

## 2018-08-22 PROCEDURE — 86901 BLOOD TYPING SEROLOGIC RH(D): CPT

## 2018-08-22 PROCEDURE — 85610 PROTHROMBIN TIME: CPT

## 2018-08-22 PROCEDURE — 81001 URINALYSIS AUTO W/SCOPE: CPT

## 2018-08-22 PROCEDURE — 86900 BLOOD TYPING SEROLOGIC ABO: CPT

## 2018-08-22 RX ORDER — ACETAMINOPHEN 500 MG
2 TABLET ORAL
Qty: 0 | Refills: 0 | COMMUNITY
Start: 2018-08-22

## 2018-08-22 RX ADMIN — Medication 650 MILLIGRAM(S): at 06:58

## 2018-08-22 RX ADMIN — Medication 650 MILLIGRAM(S): at 06:23

## 2018-08-22 RX ADMIN — Medication 1 TABLET(S): at 12:18

## 2018-08-22 RX ADMIN — Medication 650 MILLIGRAM(S): at 13:00

## 2018-08-22 RX ADMIN — Medication 650 MILLIGRAM(S): at 00:09

## 2018-08-22 RX ADMIN — Medication 650 MILLIGRAM(S): at 01:00

## 2018-08-22 RX ADMIN — Medication 600 MILLIGRAM(S): at 07:58

## 2018-08-22 RX ADMIN — Medication 650 MILLIGRAM(S): at 12:18

## 2018-08-22 RX ADMIN — Medication 100 MILLIGRAM(S): at 00:09

## 2018-08-22 RX ADMIN — Medication 1 APPLICATION(S): at 12:18

## 2018-08-22 RX ADMIN — Medication 100 MILLIGRAM(S): at 12:18

## 2018-08-22 RX ADMIN — Medication 600 MILLIGRAM(S): at 07:26

## 2018-08-22 NOTE — PROGRESS NOTE ADULT - ASSESSMENT
A/P yo 42y  s/p , PP# , stable  1. Pain: OPM  2. GI: Reg  3. :  Voiding  4. DVT prophylaxis: SCDs, ambulation  5. Dispo: PP#2 A/P yo 42y  s/p , PP#2, stable  1. Pain: PO tylenol  2. GI: Reg  3. :  Voiding  4. DVT prophylaxis: SCDs, ambulation  5. Dispo: PP#2

## 2018-08-22 NOTE — DISCHARGE NOTE OB - CARE PLAN
Principal Discharge DX:	Postpartum state  Goal:	postpartum care  Assessment and plan of treatment:	42y female s/p vaginal delivery, postpartum day 2, meeting all postpartum milestones. Pelvic rest until cleared. Follow-up with obstetrician in 6 weeks.  Secondary Diagnosis:	Gestational hypertension  Goal:	monitor for signs and symptoms  Assessment and plan of treatment:	Call obstetrician for any of the following symptoms: changes in vision, headache not relieved with Tylenol, severe abdominal pain, vomiting, increased vaginal bleeding, chest pain or shortness of breath.

## 2018-08-22 NOTE — DISCHARGE NOTE OB - HOSPITAL COURSE
s/p  c/b PPH and gHTN.  PPH (ebl 900) - hemodynamically stable  gHTN - normotensive postpartum without toxic complaints

## 2018-08-22 NOTE — PROGRESS NOTE ADULT - ATTENDING COMMENTS
PP1, doing well. No complaints. Rosalba ANDERSONL.
Patient seen and examined at bedside. Agree with above evaluation. Patient to be discharged home today, followup in office in 6w

## 2018-08-22 NOTE — DISCHARGE NOTE OB - PATIENT PORTAL LINK FT
You can access the [facility] Patient Portal, offered by [facility], by registering with the following website: [email or web address]

## 2018-08-22 NOTE — PROGRESS NOTE ADULT - SUBJECTIVE AND OBJECTIVE BOX
Patient evaluated at bedside.   She reports pain is well controlled with motrin.    She has been ambulating without assistance, voiding spontaneously, and is breastfeeding.    She denies HA, dizziness, chest pain, palpitations, shortness of breathe, n/v, heavy vaginal bleeding or perineal discomfort.    Physical Exam:  Vital Signs Last 24 Hrs  T(C): 36.7 (22 Aug 2018 06:00), Max: 36.8 (21 Aug 2018 14:30)  T(F): 98 (22 Aug 2018 06:00), Max: 98.2 (21 Aug 2018 14:30)  HR: 80 (22 Aug 2018 06:00) (73 - 82)  BP: 124/83 (22 Aug 2018 06:00) (103/70 - 137/86)  BP(mean): --  RR: 17 (22 Aug 2018 06:00) (17 - 18)  SpO2: 96% (22 Aug 2018 06:00) (96% - 97%)    GA: NAD, A+0 x 3  Abd: + BS, soft, nontender, nondistended, no rebound or guarding, uterus firm at midline  : lochia WNL  Extremities: no swelling or calf tenderness                          9.3    9.8   )-----------( 153      ( 21 Aug 2018 06:46 )             28.6     08-20    136  |  100  |  9   ----------------------------<  118<H>  4.1   |  22  |  0.60    Ca    9.1      20 Aug 2018 18:01    TPro  6.1  /  Alb  3.2<L>  /  TBili  0.4  /  DBili  x   /  AST  27  /  ALT  13  /  AlkPhos  123<H>  08-20    PT/INR - ( 20 Aug 2018 10:06 )   PT: 9.8 sec;   INR: 0.89          PTT - ( 20 Aug 2018 10:06 )  PTT:28.1 sec Patient evaluated at bedside.   She reports pain is well controlled with tylenol.  She has been ambulating without assistance, voiding spontaneously, and is breastfeeding.  She denies HA, dizziness, chest pain, palpitations, shortness of breath, n/v, heavy vaginal bleeding or perineal discomfort.    Physical Exam:  Vital Signs Last 24 Hrs  T(C): 36.7 (22 Aug 2018 06:00), Max: 36.8 (21 Aug 2018 14:30)  T(F): 98 (22 Aug 2018 06:00), Max: 98.2 (21 Aug 2018 14:30)  HR: 80 (22 Aug 2018 06:00) (73 - 82)  BP: 124/83 (22 Aug 2018 06:00) (103/70 - 137/86)  BP(mean): --  RR: 17 (22 Aug 2018 06:00) (17 - 18)  SpO2: 96% (22 Aug 2018 06:00) (96% - 97%)    GA: NAD, A+0 x 3  Abd: + BS, soft, nontender, nondistended, no rebound or guarding, uterus firm at midline  : lochia WNL  Extremities: no swelling or calf tenderness                          9.3    9.8   )-----------( 153      ( 21 Aug 2018 06:46 )             28.6     08-20    136  |  100  |  9   ----------------------------<  118<H>  4.1   |  22  |  0.60    Ca    9.1      20 Aug 2018 18:01    TPro  6.1  /  Alb  3.2<L>  /  TBili  0.4  /  DBili  x   /  AST  27  /  ALT  13  /  AlkPhos  123<H>  08-20    PT/INR - ( 20 Aug 2018 10:06 )   PT: 9.8 sec;   INR: 0.89          PTT - ( 20 Aug 2018 10:06 )  PTT:28.1 sec

## 2018-08-22 NOTE — DISCHARGE NOTE OB - PLAN OF CARE
postpartum care 42y female s/p vaginal delivery, postpartum day 2, meeting all postpartum milestones. Pelvic rest until cleared. Follow-up with obstetrician in 6 weeks. monitor for signs and symptoms Call obstetrician for any of the following symptoms: changes in vision, headache not relieved with Tylenol, severe abdominal pain, vomiting, increased vaginal bleeding, chest pain or shortness of breath.

## 2018-08-22 NOTE — DISCHARGE NOTE OB - CARE PROVIDER_API CALL
Lucia Arreaga (MD), Obstetrics and Gynecology  [address] [place] [address]  Phone: [phone]  Fax: [phone]

## 2018-08-22 NOTE — DISCHARGE NOTE OB - MATERIALS PROVIDED
[facility] [place] Screening Program/Breastfeeding Log/Guide to Postpartum Care/Back To Sleep Handout/Shaken Baby Prevention Handout/[facility] Hearing Screen Program/Breastfeeding Guide and Packet/  Immunization Record/Birth Certificate Instructions/Tdap Vaccination (VIS Pub Date: 2012)

## 2018-08-22 NOTE — DISCHARGE NOTE OB - MEDICATION SUMMARY - MEDICATIONS TO TAKE
I will START or STAY ON the medications listed below when I get home from the hospital:    acetaminophen 325 mg oral tablet  -- 2 tab(s) by mouth every 6 hours, As needed, Mild Pain  -- Indication: For as needed for pain    acetaminophen 325 mg oral tablet  -- 2 tab(s) by mouth every 6 hours, As needed, For Temp greater than 38.5 C (101.3 F)  -- Indication: For fever    Prenatal 1 oral capsule  -- Indication: For Postpartum state

## 2018-08-24 LAB — SURGICAL PATHOLOGY STUDY: SIGNIFICANT CHANGE UP

## 2018-08-27 DIAGNOSIS — Z3A.39 39 WEEKS GESTATION OF PREGNANCY: ICD-10-CM

## 2018-08-27 DIAGNOSIS — O99.89 OTHER SPECIFIED DISEASES AND CONDITIONS COMPLICATING PREGNANCY, CHILDBIRTH AND THE PUERPERIUM: ICD-10-CM

## 2018-08-27 DIAGNOSIS — R33.9 RETENTION OF URINE, UNSPECIFIED: ICD-10-CM

## 2018-08-27 DIAGNOSIS — Z34.83 ENCOUNTER FOR SUPERVISION OF OTHER NORMAL PREGNANCY, THIRD TRIMESTER: ICD-10-CM

## 2021-04-14 ENCOUNTER — APPOINTMENT (OUTPATIENT)
Age: 46
End: 2021-04-14
Payer: COMMERCIAL

## 2021-04-14 PROCEDURE — 0001A: CPT

## 2021-05-05 ENCOUNTER — APPOINTMENT (OUTPATIENT)
Age: 46
End: 2021-05-05
Payer: COMMERCIAL

## 2021-05-05 PROBLEM — Z00.00 ENCOUNTER FOR PREVENTIVE HEALTH EXAMINATION: Status: ACTIVE | Noted: 2021-05-05

## 2021-05-05 PROCEDURE — 0002A: CPT

## 2021-09-20 NOTE — DISCHARGE NOTE OB - BREASTFEEDING PROVIDES MATERNAL HEALTH BENEFITS, DECREASED PREMENOPAUSAL BREAST CANCER, OVARIAN CANCER AND TYPE II DIABETES MELLITUS
Statement Selected Cyclosporine Pregnancy And Lactation Text: This medication is Pregnancy Category C and it isn't know if it is safe during pregnancy. This medication is excreted in breast milk.

## 2022-03-22 NOTE — DISCHARGE NOTE OB - NS AS DC AMI YN
Pt was at PMD for chest pain synopsized twice today no dizziness now. had EKG done at Pediatrician, had labs drawn no results yet.  Pt is alert awake, and appropriate, in no acute distress, o2 sat 100% on room air clear lungs b/l, no increased work of breathing,  apical pulse auscultated
no

## 2022-05-03 NOTE — ED PROVIDER NOTE - NS HIV RISK FACTOR YES
Quality 226: Preventive Care And Screening: Tobacco Use: Screening And Cessation Intervention: Patient screened for tobacco use and is an ex/non-smoker Quality 110: Preventive Care And Screening: Influenza Immunization: Influenza Immunization Administered during Influenza season Detail Level: Detailed Declined Yes

## 2022-10-25 ENCOUNTER — OFFICE (OUTPATIENT)
Dept: URBAN - METROPOLITAN AREA CLINIC 28 | Facility: CLINIC | Age: 47
Setting detail: OPHTHALMOLOGY
End: 2022-10-25
Payer: COMMERCIAL

## 2022-10-25 VITALS — HEIGHT: 63 IN | BODY MASS INDEX: 21.26 KG/M2 | WEIGHT: 120 LBS

## 2022-10-25 DIAGNOSIS — H52.4: ICD-10-CM

## 2022-10-25 DIAGNOSIS — H25.13: ICD-10-CM

## 2022-10-25 PROCEDURE — 92015 DETERMINE REFRACTIVE STATE: CPT | Performed by: OPHTHALMOLOGY

## 2022-10-25 PROCEDURE — 92002 INTRM OPH EXAM NEW PATIENT: CPT | Performed by: OPHTHALMOLOGY

## 2022-10-25 ASSESSMENT — REFRACTION_CURRENTRX
OS_SPHERE: -0.50
OD_AXIS: 097
OS_CYLINDER: 0.00
OS_AXIS: 180
OD_OVR_VA: 20/
OD_SPHERE: -0.50
OD_CYLINDER: -0.25
OS_OVR_VA: 20/

## 2022-10-25 ASSESSMENT — AXIALLENGTH_DERIVED
OD_AL: 23.6086
OS_AL: 23.466
OS_AL: 23.5627
OD_AL: 23.6086

## 2022-10-25 ASSESSMENT — REFRACTION_MANIFEST
OS_AXIS: 100
OD_AXIS: 090
OS_VA1: 20/20
OS_CYLINDER: -0.50
OD_SPHERE: -0.50
OS_ADD: +1.75
OD_VA1: 20/20
OD_CYLINDER: -0.50
OS_SPHERE: -0.50
OD_ADD: +1.75

## 2022-10-25 ASSESSMENT — KERATOMETRY
OS_AXISANGLE_DEGREES: 134
OD_K2POWER_DIOPTERS: 44.50
OD_K1POWER_DIOPTERS: 44.00
OS_K2POWER_DIOPTERS: 44.50
OS_K1POWER_DIOPTERS: 44.25
OD_AXISANGLE_DEGREES: 054

## 2022-10-25 ASSESSMENT — TONOMETRY
OD_IOP_MMHG: 12
OS_IOP_MMHG: 15

## 2022-10-25 ASSESSMENT — REFRACTION_AUTOREFRACTION
OD_CYLINDER: -0.50
OS_SPHERE: -0.25
OD_SPHERE: -0.50
OS_AXIS: 102
OS_CYLINDER: -0.50
OD_AXIS: 105

## 2022-10-25 ASSESSMENT — VISUAL ACUITY
OD_BCVA: 20/20
OS_BCVA: 20/20

## 2022-10-25 ASSESSMENT — SPHEQUIV_DERIVED
OS_SPHEQUIV: -0.5
OD_SPHEQUIV: -0.75
OD_SPHEQUIV: -0.75
OS_SPHEQUIV: -0.75
